# Patient Record
Sex: MALE | Race: WHITE | Employment: FULL TIME | URBAN - METROPOLITAN AREA
[De-identification: names, ages, dates, MRNs, and addresses within clinical notes are randomized per-mention and may not be internally consistent; named-entity substitution may affect disease eponyms.]

---

## 2023-11-03 PROBLEM — R53.81 MALAISE AND FATIGUE: Status: ACTIVE | Noted: 2023-11-03

## 2023-11-03 PROBLEM — S05.02XA CORNEAL ABRASION, LEFT: Status: ACTIVE | Noted: 2023-11-03

## 2023-11-03 PROBLEM — J45.909 ASTHMATIC BRONCHITIS (HHS-HCC): Status: ACTIVE | Noted: 2023-11-03

## 2023-11-03 PROBLEM — L40.0 PSORIASIS VULGARIS: Status: ACTIVE | Noted: 2022-09-06

## 2023-11-03 PROBLEM — R53.83 MALAISE AND FATIGUE: Status: ACTIVE | Noted: 2023-11-03

## 2023-11-03 PROBLEM — H57.89 EYE IRRITATION: Status: ACTIVE | Noted: 2023-11-03

## 2023-11-03 PROBLEM — J98.01 COUGH DUE TO BRONCHOSPASM: Status: ACTIVE | Noted: 2023-11-03

## 2023-11-03 RX ORDER — DOXYCYCLINE 100 MG/1
1 TABLET ORAL EVERY 12 HOURS
COMMUNITY
Start: 2019-10-19

## 2023-11-03 RX ORDER — IXEKIZUMAB 80 MG/ML
INJECTION, SOLUTION SUBCUTANEOUS
COMMUNITY
Start: 2022-09-06 | End: 2023-11-07 | Stop reason: ALTCHOICE

## 2023-11-03 RX ORDER — ALBUTEROL SULFATE 90 UG/1
AEROSOL, METERED RESPIRATORY (INHALATION)
COMMUNITY
Start: 2019-10-19

## 2023-11-03 RX ORDER — CLOBETASOL PROPIONATE 0.5 MG/G
CREAM TOPICAL
COMMUNITY
Start: 2021-08-14

## 2023-11-03 RX ORDER — BUPRENORPHINE AND NALOXONE 8; 2 MG/1; MG/1
FILM, SOLUBLE BUCCAL; SUBLINGUAL
COMMUNITY
Start: 2017-02-02

## 2023-11-07 ENCOUNTER — OFFICE VISIT (OUTPATIENT)
Dept: DERMATOLOGY | Facility: CLINIC | Age: 33
End: 2023-11-07
Payer: COMMERCIAL

## 2023-11-07 DIAGNOSIS — L95.9 VASCULITIS LIMITED TO SKIN: ICD-10-CM

## 2023-11-07 DIAGNOSIS — L40.9 PSORIASIS: Primary | ICD-10-CM

## 2023-11-07 PROCEDURE — 99214 OFFICE O/P EST MOD 30 MIN: CPT | Performed by: NURSE PRACTITIONER

## 2023-11-07 NOTE — PROGRESS NOTES
Subjective     Finesse Mcconnell is a 33 y.o. male who presents for the following: Psoriasis (Overall doing well, on Taltz. Here for refills. No further complaints.).     Review of Systems:  No other skin or systemic complaints other than what is documented elsewhere in the note.    The following portions of the chart were reviewed this encounter and updated as appropriate:  Tobacco  Allergies  Meds  Problems  Med Hx  Surg Hx  Fam Hx         Skin Cancer History  No skin cancer on file.      Specialty Problems          Dermatology Problems    Psoriasis vulgaris        Objective   Well appearing patient in no apparent distress; mood and affect are within normal limits.    A full examination was performed including scalp, head, eyes, ears, nose, lips, neck, chest, axillae, abdomen, back, buttocks, bilateral upper extremities, bilateral lower extremities, hands, feet, fingers, toes, fingernails, and toenails. All findings within normal limits unless otherwise noted below.    Assessment/Plan   1. Psoriasis  Skin clear on exam. PASI 100 on Taltz    - Psoriasis is a common, noncontagious condition that can present in a variety of ways in the skin. The subtypes of this condition include plaque, inverse (or skin fold), guttate, erythrodermic, and pustular psoriasis. Plaque psoriasis, which represents approximately 85% of psoriasis cases, is a lifelong skin condition that affects about 2%-3% of the population worldwide. Plaque psoriasis skin lesions are typically red and raised with overlying scale. There may be papules (small, raised bumps) or plaques (larger, raised skin lesions that are bigger than a thumbnail), or both. People with plaque psoriasis typically have thickened, white scaly patches on their skin.  - While the exact cause of psoriasis is unknown, this condition is the result of an overactive immune system that attacks the skin and other organs of the body. Psoriasis is very common in some families,  "suggesting a likely genetic component contributing to this disease, but it can also occur in individuals with no family history of psoriasis. Psoriasis can be triggered by certain environmental causes, such as emotional stress, pregnancy, injury to the skin, bacterial skin infections such as a streptococcal infection (\"strep\"), smoking or alcohol consumption, and ingesting certain medications.  - Because plaque psoriasis is a lifelong, chronic condition that currently has no cure, the goal of treatment is to decrease the number of skin lesions and reduce symptoms such as itching and pain. Most beneficial treatments for plaque psoriasis work in part due to their ability to alleviate the body's abnormal immune attack of the skin and help prevent the excessive buildup of skin cells or flakes.  - Bathe daily to help soften scales and moisten the skin. Avoid harsh soaps and scrubbing the skin as these may worsen psoriasis. Moisturizing soaps and soap substitutes, such as unscented Dove Sensitive Skin Beauty Bar, Vanicream Cleansing Bar, and CeraVe Psoriasis Cleanser, are milder products for the skin.  - The application of moisturizers after water exposure or bathing may be helpful. Heavier oil-based moisturizers help to retain water in the skin better than water-based moisturizers. Thicker moisturizers such as petroleum jelly (Vaseline), Aquaphor Healing Ointment, - Eucerin Original Healing Cream, Vanicream, Aveeno Moisturizing Cream, CeraVe Healing Ointment, or CeraVe Moisturizing Cream can be applied to damp skin daily after bathing. Use cream and ointments rather than lotions because lotions can dry out the skin.  - Apply over-the-counter hydrocortisone cream or ointment (0.5% or 1%) twice daily for 2-3 weeks at a time to help reduce itch and irritation. Stronger topical steroids are typically required for thicker psoriasis plaques. Long-term use of topical steroids should include periodic times of no treatment each " month to avoid thinning of the skin.  - Use of products with salicylic acid (shampoos, cleansers, and ointments), such as Neutrogena T/Sal, can help soften and remove thick psoriasis scale in the scalp.  - Small doses of natural sunlight may be helpful, such as 10-15 minutes approximately 2 or 3 times per week. Avoid too much sun; however, and protect your healthy skin from excessive sun exposure to help prevent premature aging of the skin and skin cancers.  - Follow a healthy diet to maintain an ideal weight. (Being overweight may make plaque psoriasis worse.)  Patient Support Resources  - The National Psoriasis Foundation (https://www.psoriasis.org/) is a useful resource for patients and health professionals that has additional information regarding psoriasis and the various available treatments. The National Psoriasis Foundation website includes access to psoriasis-related articles, psoriasis research, a directory of health care professionals with an expertise in psoriasis, and opportunities for patients to volunteer or get involved in upcoming events.  Plan  - Continue Taltz as prescribed (sample dose given today)  - Labs ordered; Tspot (lab order given - goes to [facility])  - Discussed risks, benefits, and side effects of medications.     Follow Up In Dermatology - Established Patient    2. 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  - A variety of inciting factors have been identified, including medications (especially antibiotics, NSAIDs, and diuretics), pathogens (hepatitis viruses, human immunodeficiency virus [HIV], Sapphire-Barr virus, and streptococci), malignancy, inflammatory bowel disease, or connective tissue disease (systemic lupus erythematosus [SLE], Sjögren syndrome, and rheumatoid  arthritis). Exercise-induced vasculitis, also known as golfer's vasculitis, is a very rare cause of LCV. It is triggered by strenuous exercise, such as running, prolonged walking, or golfing, particularly during warm weather. Up to 50% of cases, however, have no identifiable cause and are considered idiopathic.  - The following supportive / symptomatic measures may be taken:  Rest with elevation of the legs  Graduated compression stockings  NSAIDs for discomfort  - Please call the office if the condition worsens or fails to resolve.

## 2023-11-17 DIAGNOSIS — L40.9 PSORIASIS: ICD-10-CM

## 2024-02-14 ENCOUNTER — TELEPHONE (OUTPATIENT)
Dept: DERMATOLOGY | Facility: CLINIC | Age: 34
End: 2024-02-14
Payer: COMMERCIAL

## 2024-02-14 DIAGNOSIS — L40.9 PSORIASIS: ICD-10-CM

## 2024-02-14 NOTE — TELEPHONE ENCOUNTER
Pt left  stating that his insurance has changed and he now is using [facility] instead of [facility]. Can you please resend his Taltz prescription to [facility]?     Thanks!  Felicita Zuluaga RN

## 2024-06-13 DIAGNOSIS — L40.0 PSORIASIS VULGARIS: Primary | ICD-10-CM

## 2024-06-19 ENCOUNTER — LAB (OUTPATIENT)
Dept: LAB | Facility: LAB | Age: 34
End: 2024-06-19
Payer: COMMERCIAL

## 2024-06-19 ENCOUNTER — APPOINTMENT (OUTPATIENT)
Dept: DERMATOLOGY | Facility: CLINIC | Age: 34
End: 2024-06-19
Payer: COMMERCIAL

## 2024-06-19 DIAGNOSIS — L40.0 PSORIASIS VULGARIS: ICD-10-CM

## 2024-06-19 DIAGNOSIS — L40.9 PSORIASIS: ICD-10-CM

## 2024-06-19 DIAGNOSIS — I78.8 CAPILLARITIS: Primary | ICD-10-CM

## 2024-06-19 PROCEDURE — 36415 COLL VENOUS BLD VENIPUNCTURE: CPT

## 2024-06-19 PROCEDURE — 86481 TB AG RESPONSE T-CELL SUSP: CPT

## 2024-06-19 PROCEDURE — 99214 OFFICE O/P EST MOD 30 MIN: CPT | Performed by: NURSE PRACTITIONER

## 2024-06-19 NOTE — PROGRESS NOTES
Subjective     Finesse Mcconnell is a 33 y.o. male who presents for the following: Psoriasis.     Review of Systems:  No other skin or systemic complaints other than what is documented elsewhere in the note.    The following portions of the chart were reviewed this encounter and updated as appropriate:   Tobacco  Allergies  Meds  Problems  Med Hx  Surg Hx  Fam Hx         Skin Cancer History  No skin cancer on file.      Specialty Problems          Dermatology Problems    Psoriasis vulgaris        Objective   Well appearing patient in no apparent distress; mood and affect are within normal limits.    A focused skin examination was performed. All findings within normal limits unless otherwise noted below.    Assessment/Plan   1. Capillaritis (2)  Left Lower Leg - Anterior, Right Lower Leg - Anterior  Pinpoint nonblanching petechiae and red-to-tinoco-brown pigmented macules and plaques.    - Capillaritis is a condition where inflammation of the tiniest blood vessels in the skin results in leakage of blood and petechiae (tiny spots of bleeding under the skin that do not chuck when you press on them). Capillaritis is frequently found in individuals who stand for long periods of time, such as people who are on their feet a lot for work. Capillaritis can come and go intermittently.  - Reassured patient, no treatment is necessary.     2. Psoriasis  Skin clear on exam. PASI 100 on Taltz.     - Psoriasis is a common, noncontagious condition that can present in a variety of ways in the skin. The subtypes of this condition include plaque, inverse (or skin fold), guttate, erythrodermic, and pustular psoriasis. Plaque psoriasis, which represents approximately 85% of psoriasis cases, is a lifelong skin condition that affects about 2%-3% of the population worldwide. Plaque psoriasis skin lesions are typically red and raised with overlying scale. There may be papules (small, raised bumps) or plaques (larger, raised skin  "lesions that are bigger than a thumbnail), or both. People with plaque psoriasis typically have thickened, white scaly patches on their skin.  - While the exact cause of psoriasis is unknown, this condition is the result of an overactive immune system that attacks the skin and other organs of the body. Psoriasis is very common in some families, suggesting a likely genetic component contributing to this disease, but it can also occur in individuals with no family history of psoriasis. Psoriasis can be triggered by certain environmental causes, such as emotional stress, pregnancy, injury to the skin, bacterial skin infections such as a streptococcal infection (\"strep\"), smoking or alcohol consumption, and ingesting certain medications.  - Because plaque psoriasis is a lifelong, chronic condition that currently has no cure, the goal of treatment is to decrease the number of skin lesions and reduce symptoms such as itching and pain. Most beneficial treatments for plaque psoriasis work in part due to their ability to alleviate the body's abnormal immune attack of the skin and help prevent the excessive buildup of skin cells or flakes.  - Bathe daily to help soften scales and moisten the skin. Avoid harsh soaps and scrubbing the skin as these may worsen psoriasis. Moisturizing soaps and soap substitutes, such as unscented Dove Sensitive Skin Beauty Bar, Vanicream Cleansing Bar, and CeraVe Psoriasis Cleanser, are milder products for the skin.  - The application of moisturizers after water exposure or bathing may be helpful. Heavier oil-based moisturizers help to retain water in the skin better than water-based moisturizers. Thicker moisturizers such as petroleum jelly (Vaseline), Aquaphor Healing Ointment, - Eucerin Original Healing Cream, Vanicream, Aveeno Moisturizing Cream, CeraVe Healing Ointment, or CeraVe Moisturizing Cream can be applied to damp skin daily after bathing. Use cream and ointments rather than lotions " "because lotions can dry out the skin.  - Apply over-the-counter hydrocortisone cream or ointment (0.5% or 1%) twice daily for 2-3 weeks at a time to help reduce itch and irritation. Stronger topical steroids are typically required for thicker psoriasis plaques. Long-term use of topical steroids should include periodic times of no treatment each month to avoid thinning of the skin.  - Use of products with salicylic acid (shampoos, cleansers, and ointments), such as Neutrogena T/Sal, can help soften and remove thick psoriasis scale in the scalp.  - Small doses of natural sunlight may be helpful, such as 10-15 minutes approximately 2 or 3 times per week. Avoid too much sun; however, and protect your healthy skin from excessive sun exposure to help prevent premature aging of the skin and skin cancers.  - Follow a healthy diet to maintain an ideal weight. (Being overweight may make plaque psoriasis worse.)  Patient Support Resources  - The National Psoriasis Foundation (https://www.psoriasis.org/) is a useful resource for patients and health professionals that has additional information regarding psoriasis and the various available treatments. The National Psoriasis Foundation website includes access to psoriasis-related articles, psoriasis research, a directory of health care professionals with an expertise in psoriasis, and opportunities for patients to volunteer or get involved in upcoming events.  Plan  - Continue Taltz as directed.   - Labs ordered; Tspot.   - Discussed risks, benefits, and side effects of medications.     Related Medications  ixekizumab (Taltz) 80 mg/mL injection  Inject 1 mL (80 mg) under the skin every 28 (twenty-eight) days.        Psoriasis follow-up, overall doing well. Has (asymptomatic) \"rash\" to lower legs, PCP advised petechiae. Due for t-spot. No further complaints.  "

## 2024-06-21 LAB
NIL(NEG) CONTROL SPOT COUNT: NORMAL
PANEL A SPOT COUNT: 1
PANEL B SPOT COUNT: 1
POS CONTROL SPOT COUNT: NORMAL
T-SPOT. TB INTERPRETATION: NEGATIVE

## 2024-08-07 ENCOUNTER — APPOINTMENT (OUTPATIENT)
Dept: DERMATOLOGY | Facility: CLINIC | Age: 34
End: 2024-08-07
Payer: COMMERCIAL

## 2024-10-15 ENCOUNTER — TELEPHONE (OUTPATIENT)
Dept: DERMATOLOGY | Facility: CLINIC | Age: 34
End: 2024-10-15
Payer: COMMERCIAL

## 2024-10-15 NOTE — TELEPHONE ENCOUNTER
[facility] contacted office regarding a PA for pt's Taltz. Reached out to  Specialty [facility] and was updated that pt got new insurance and is requiring a new PA and that the Rx be sent to [facility] to be filled.  [facility] aware and working on new PA.     Felicita Zuluaga RN

## 2024-10-17 ENCOUNTER — TELEPHONE (OUTPATIENT)
Dept: DERMATOLOGY | Facility: CLINIC | Age: 34
End: 2024-10-17
Payer: COMMERCIAL

## 2024-10-17 NOTE — TELEPHONE ENCOUNTER
Pt contacted office stating that the pharmacy called him to notify him that he needed a prior authorization for his Taltz due to his insurance changing. Called pt and LVM stating that  [facility] has been notified of the change and is working on a new PA. Advised pt to call with any other questions or concerns.     Felicita Zuluaga RN

## 2024-10-23 ENCOUNTER — TELEPHONE (OUTPATIENT)
Dept: DERMATOLOGY | Facility: CLINIC | Age: 34
End: 2024-10-23
Payer: COMMERCIAL

## 2024-10-23 DIAGNOSIS — L40.9 PSORIASIS: Primary | ICD-10-CM

## 2024-10-23 RX ORDER — IXEKIZUMAB 80 MG/ML
80 INJECTION, SOLUTION SUBCUTANEOUS
Qty: 1 ML | Refills: 11 | Status: SHIPPED | OUTPATIENT
Start: 2024-10-23

## 2024-10-23 NOTE — TELEPHONE ENCOUNTER
Pt contacted office stating that he has not been able to get his most recent taltz injection due to waiting for the PA to be approved through his new insurance. Reached out to  [facility] and was informed that pt was approved and new medication will be sent to mandated pharmacy. Pt was told he could  sample of taltz in office for this month's injection.     Felicita Zuluaga RN

## 2024-10-23 NOTE — PROGRESS NOTES
Taltz prior authorization approved on patient's new insurance plan until 10/21/2025. Must fill with [facility].     Clinical pharmacist routed [facility] refills to [facility] per insuranc mandate.

## 2024-11-15 ENCOUNTER — APPOINTMENT (OUTPATIENT)
Dept: DERMATOLOGY | Facility: CLINIC | Age: 34
End: 2024-11-15
Payer: COMMERCIAL

## 2024-11-15 DIAGNOSIS — L40.9 PSORIASIS: Primary | ICD-10-CM

## 2024-11-15 PROCEDURE — 99213 OFFICE O/P EST LOW 20 MIN: CPT | Performed by: NURSE PRACTITIONER

## 2024-11-15 RX ORDER — LISINOPRIL AND HYDROCHLOROTHIAZIDE 20; 25 MG/1; MG/1
1 TABLET ORAL
COMMUNITY
Start: 2024-11-07

## 2024-11-15 NOTE — PROGRESS NOTES
Subjective     Finesse Mcconnell is a 34 y.o. male who presents for the following: Psoriasis (Pt doing well on taltz. Pt has a few areas on his legs he would like looked at. ).     Review of Systems:  No other skin or systemic complaints other than what is documented elsewhere in the note.    The following portions of the chart were reviewed this encounter and updated as appropriate:         Skin Cancer History  No skin cancer on file.      Specialty Problems          Dermatology Problems    Psoriasis vulgaris        Objective   Well appearing patient in no apparent distress; mood and affect are within normal limits.    A full examination was performed including scalp, head, eyes, ears, nose, lips, neck, chest, axillae, abdomen, back, buttocks, bilateral upper extremities, bilateral lower extremities, hands, feet, fingers, toes, fingernails, and toenails. All findings within normal limits unless otherwise noted below.    Assessment/Plan   1. Psoriasis  Skin clear on exam. PASI 100 on Taltz.     - Psoriasis is a common, noncontagious condition that can present in a variety of ways in the skin. The subtypes of this condition include plaque, inverse (or skin fold), guttate, erythrodermic, and pustular psoriasis. Plaque psoriasis, which represents approximately 85% of psoriasis cases, is a lifelong skin condition that affects about 2%-3% of the population worldwide. Plaque psoriasis skin lesions are typically red and raised with overlying scale. There may be papules (small, raised bumps) or plaques (larger, raised skin lesions that are bigger than a thumbnail), or both. People with plaque psoriasis typically have thickened, white scaly patches on their skin.  - While the exact cause of psoriasis is unknown, this condition is the result of an overactive immune system that attacks the skin and other organs of the body. Psoriasis is very common in some families, suggesting a likely genetic component contributing to this  "disease, but it can also occur in individuals with no family history of psoriasis. Psoriasis can be triggered by certain environmental causes, such as emotional stress, pregnancy, injury to the skin, bacterial skin infections such as a streptococcal infection (\"strep\"), smoking or alcohol consumption, and ingesting certain medications.  - Because plaque psoriasis is a lifelong, chronic condition that currently has no cure, the goal of treatment is to decrease the number of skin lesions and reduce symptoms such as itching and pain. Most beneficial treatments for plaque psoriasis work in part due to their ability to alleviate the body's abnormal immune attack of the skin and help prevent the excessive buildup of skin cells or flakes.  - Bathe daily to help soften scales and moisten the skin. Avoid harsh soaps and scrubbing the skin as these may worsen psoriasis. Moisturizing soaps and soap substitutes, such as unscented Dove Sensitive Skin Beauty Bar, Vanicream Cleansing Bar, and CeraVe Psoriasis Cleanser, are milder products for the skin.  - The application of moisturizers after water exposure or bathing may be helpful. Heavier oil-based moisturizers help to retain water in the skin better than water-based moisturizers. Thicker moisturizers such as petroleum jelly (Vaseline), Aquaphor Healing Ointment, - Eucerin Original Healing Cream, Vanicream, Aveeno Moisturizing Cream, CeraVe Healing Ointment, or CeraVe Moisturizing Cream can be applied to damp skin daily after bathing. Use cream and ointments rather than lotions because lotions can dry out the skin.  - Apply over-the-counter hydrocortisone cream or ointment (0.5% or 1%) twice daily for 2-3 weeks at a time to help reduce itch and irritation. Stronger topical steroids are typically required for thicker psoriasis plaques. Long-term use of topical steroids should include periodic times of no treatment each month to avoid thinning of the skin.  - Use of products with " salicylic acid (shampoos, cleansers, and ointments), such as Neutrogena T/Sal, can help soften and remove thick psoriasis scale in the scalp.  - Small doses of natural sunlight may be helpful, such as 10-15 minutes approximately 2 or 3 times per week. Avoid too much sun; however, and protect your healthy skin from excessive sun exposure to help prevent premature aging of the skin and skin cancers.  - Follow a healthy diet to maintain an ideal weight. (Being overweight may make plaque psoriasis worse.)  Patient Support Resources  - The National Psoriasis Foundation (https://www.psoriasis.org/) is a useful resource for patients and health professionals that has additional information regarding psoriasis and the various available treatments. The National Psoriasis Foundation website includes access to psoriasis-related articles, psoriasis research, a directory of health care professionals with an expertise in psoriasis, and opportunities for patients to volunteer or get involved in upcoming events.  Plan  - Continue Taltz as directed. Sample given today.   - Will repeat Tspot in May 2025  - Discussed risks, benefits, and side effects of medications.     Related Medications  ixekizumab (Taltz Autoinjector) 80 mg/mL injection  Inject 1 mL (80 mg) under the skin every 28 (twenty-eight) days.

## 2024-12-11 ENCOUNTER — APPOINTMENT (OUTPATIENT)
Dept: DERMATOLOGY | Facility: CLINIC | Age: 34
End: 2024-12-11
Payer: COMMERCIAL

## 2025-05-16 ENCOUNTER — APPOINTMENT (OUTPATIENT)
Dept: DERMATOLOGY | Facility: CLINIC | Age: 35
End: 2025-05-16
Payer: COMMERCIAL

## 2025-05-16 DIAGNOSIS — L72.0 EPIDERMAL CYST: ICD-10-CM

## 2025-05-16 DIAGNOSIS — L40.9 PSORIASIS: Primary | ICD-10-CM

## 2025-05-16 DIAGNOSIS — Z79.899 OTHER LONG TERM (CURRENT) DRUG THERAPY: ICD-10-CM

## 2025-05-16 PROCEDURE — 99214 OFFICE O/P EST MOD 30 MIN: CPT | Performed by: NURSE PRACTITIONER

## 2025-05-16 NOTE — PROGRESS NOTES
Subjective     Finesse Mcconnell is a 34 y.o. male who presents for the following: Psoriasis (Clear on Taltz. Due for T-spot. ).          Review of Systems:  No other skin or systemic complaints other than what is documented elsewhere in the note.    The following portions of the chart were reviewed this encounter and updated as appropriate:  Tobacco  Allergies  Meds  Problems  Med Hx  Surg Hx  Fam Hx         Skin Cancer History  Biopsy Log Book  No skin cancers from Specimen Tracking.    Additional History      Specialty Problems          Dermatology Problems    Psoriasis vulgaris     Past Medical History:  Finesse Mcconnell  has a past medical history of Acute upper respiratory infection, unspecified (12/02/2021) and Personal history of other diseases of the respiratory system (10/19/2019).    Past Surgical History:  Finesse Mcconnell  has no past surgical history on file.    Family History:  Patient family history is not on file.    Social History:  Finesse Mcconnell  reports that he has been smoking cigarettes. He has never used smokeless tobacco. No history on file for alcohol use and drug use.    Allergies:  Patient has no known allergies.    Current Medications / CAM's:  Current Medications[1]     Objective   Well appearing patient in no apparent distress; mood and affect are within normal limits.    A focused skin examination was performed. All findings within normal limits unless otherwise noted below.    Assessment/Plan   Skin Exam  1. PSORIASIS  Generalized  Skin clear on exam. PASI 100 on Taltz.   - Psoriasis is a common, noncontagious condition that can present in a variety of ways in the skin. The subtypes of this condition include plaque, inverse (or skin fold), guttate, erythrodermic, and pustular psoriasis. Plaque psoriasis, which represents approximately 85% of psoriasis cases, is a lifelong skin condition that affects about 2%-3% of the population worldwide. Plaque psoriasis skin  "lesions are typically red and raised with overlying scale. There may be papules (small, raised bumps) or plaques (larger, raised skin lesions that are bigger than a thumbnail), or both. People with plaque psoriasis typically have thickened, white scaly patches on their skin.  - While the exact cause of psoriasis is unknown, this condition is the result of an overactive immune system that attacks the skin and other organs of the body. Psoriasis is very common in some families, suggesting a likely genetic component contributing to this disease, but it can also occur in individuals with no family history of psoriasis. Psoriasis can be triggered by certain environmental causes, such as emotional stress, pregnancy, injury to the skin, bacterial skin infections such as a streptococcal infection (\"strep\"), smoking or alcohol consumption, and ingesting certain medications.  - Because plaque psoriasis is a lifelong, chronic condition that currently has no cure, the goal of treatment is to decrease the number of skin lesions and reduce symptoms such as itching and pain. Most beneficial treatments for plaque psoriasis work in part due to their ability to alleviate the body's abnormal immune attack of the skin and help prevent the excessive buildup of skin cells or flakes.  - Bathe daily to help soften scales and moisten the skin. Avoid harsh soaps and scrubbing the skin as these may worsen psoriasis. Moisturizing soaps and soap substitutes, such as unscented Dove Sensitive Skin Beauty Bar, Vanicream Cleansing Bar, and CeraVe Psoriasis Cleanser, are milder products for the skin.  - The application of moisturizers after water exposure or bathing may be helpful. Heavier oil-based moisturizers help to retain water in the skin better than water-based moisturizers. Thicker moisturizers such as petroleum jelly (Vaseline), Aquaphor Healing Ointment, - Eucerin Original Healing Cream, Vanicream, Aveeno Moisturizing Cream, CeraVe Healing " Ointment, or CeraVe Moisturizing Cream can be applied to damp skin daily after bathing. Use cream and ointments rather than lotions because lotions can dry out the skin.  - Apply over-the-counter hydrocortisone cream or ointment (0.5% or 1%) twice daily for 2-3 weeks at a time to help reduce itch and irritation. Stronger topical steroids are typically required for thicker psoriasis plaques. Long-term use of topical steroids should include periodic times of no treatment each month to avoid thinning of the skin.  - Use of products with salicylic acid (shampoos, cleansers, and ointments), such as Neutrogena T/Sal, can help soften and remove thick psoriasis scale in the scalp.  - Small doses of natural sunlight may be helpful, such as 10-15 minutes approximately 2 or 3 times per week. Avoid too much sun; however, and protect your healthy skin from excessive sun exposure to help prevent premature aging of the skin and skin cancers.  - Follow a healthy diet to maintain an ideal weight. (Being overweight may make plaque psoriasis worse.)  Patient Support Resources  - The National Psoriasis Foundation (https://www.psoriasis.org/) is a useful resource for patients and health professionals that has additional information regarding psoriasis and the various available treatments. The National Psoriasis Foundation website includes access to psoriasis-related articles, psoriasis research, a directory of health care professionals with an expertise in psoriasis, and opportunities for patients to volunteer or get involved in upcoming events.  Plan  - Continue Taltz as directed.   - Tspot ordered today  - Discussed risks, benefits, and side effects of medications.   Related Procedures  T-Spot TB  Related Medications  ixekizumab (Taltz Autoinjector) 80 mg/mL injection  Inject 1 mL (80 mg) under the skin every 28 (twenty-eight) days.  2. OTHER LONG TERM (CURRENT) DRUG THERAPY      Related Procedures  T-Spot TB  3. EPIDERMAL CYST  Chest  - Medial (Center)  1.5 cm subcutaneous nodule with normal-appearing overlying skin and connecting pore  -Discussed nature of the condition  -If approved by insurance, the office will call to schedule the procedure.   - Risks, benefits, and side effects discussed. Patient understood and agrees with the plan.     Related Procedures  Referral to Dermatology - Mohs Surgery         [1]   Current Outpatient Medications:     albuterol (Ventolin HFA) 90 mcg/actuation inhaler, INHALE 2 PUFFS EVERY 4-6 HOURS, SPACED 60 SECONDS APART., Disp: , Rfl:     buprenorphine-naloxone (Suboxone) 8-2 mg SL film, Place under the tongue., Disp: , Rfl:     clobetasol (Temovate) 0.05 % cream, 1 Application, Disp: , Rfl:     doxycycline (Adoxa) 100 mg tablet, Take 1 tablet (100 mg) by mouth every 12 hours., Disp: , Rfl:     ixekizumab (Taltz Autoinjector) 80 mg/mL injection, Inject 1 mL (80 mg) under the skin every 28 (twenty-eight) days., Disp: 1 mL, Rfl: 11    lisinopriL-hydrochlorothiazide 20-25 mg tablet, Take 1 tablet by mouth early in the morning.., Disp: , Rfl:

## 2025-05-16 NOTE — Clinical Note
"- Psoriasis is a common, noncontagious condition that can present in a variety of ways in the skin. The subtypes of this condition include plaque, inverse (or skin fold), guttate, erythrodermic, and pustular psoriasis. Plaque psoriasis, which represents approximately 85% of psoriasis cases, is a lifelong skin condition that affects about 2%-3% of the population worldwide. Plaque psoriasis skin lesions are typically red and raised with overlying scale. There may be papules (small, raised bumps) or plaques (larger, raised skin lesions that are bigger than a thumbnail), or both. People with plaque psoriasis typically have thickened, white scaly patches on their skin.  - While the exact cause of psoriasis is unknown, this condition is the result of an overactive immune system that attacks the skin and other organs of the body. Psoriasis is very common in some families, suggesting a likely genetic component contributing to this disease, but it can also occur in individuals with no family history of psoriasis. Psoriasis can be triggered by certain environmental causes, such as emotional stress, pregnancy, injury to the skin, bacterial skin infections such as a streptococcal infection (\"strep\"), smoking or alcohol consumption, and ingesting certain medications.  - Because plaque psoriasis is a lifelong, chronic condition that currently has no cure, the goal of treatment is to decrease the number of skin lesions and reduce symptoms such as itching and pain. Most beneficial treatments for plaque psoriasis work in part due to their ability to alleviate the body's abnormal immune attack of the skin and help prevent the excessive buildup of skin cells or flakes.  - Bathe daily to help soften scales and moisten the skin. Avoid harsh soaps and scrubbing the skin as these may worsen psoriasis. Moisturizing soaps and soap substitutes, such as unscented Dove Sensitive Skin Beauty Bar, Vanicream Cleansing Bar, and CeraVe Psoriasis " Cleanser, are milder products for the skin.  - The application of moisturizers after water exposure or bathing may be helpful. Heavier oil-based moisturizers help to retain water in the skin better than water-based moisturizers. Thicker moisturizers such as petroleum jelly (Vaseline), Aquaphor Healing Ointment, - Eucerin Original Healing Cream, Vanicream, Aveeno Moisturizing Cream, CeraVe Healing Ointment, or CeraVe Moisturizing Cream can be applied to damp skin daily after bathing. Use cream and ointments rather than lotions because lotions can dry out the skin.  - Apply over-the-counter hydrocortisone cream or ointment (0.5% or 1%) twice daily for 2-3 weeks at a time to help reduce itch and irritation. Stronger topical steroids are typically required for thicker psoriasis plaques. Long-term use of topical steroids should include periodic times of no treatment each month to avoid thinning of the skin.  - Use of products with salicylic acid (shampoos, cleansers, and ointments), such as Neutrogena T/Sal, can help soften and remove thick psoriasis scale in the scalp.  - Small doses of natural sunlight may be helpful, such as 10-15 minutes approximately 2 or 3 times per week. Avoid too much sun; however, and protect your healthy skin from excessive sun exposure to help prevent premature aging of the skin and skin cancers.  - Follow a healthy diet to maintain an ideal weight. (Being overweight may make plaque psoriasis worse.)  Patient Support Resources  - The National Psoriasis Foundation (https://www.psoriasis.org/) is a useful resource for patients and health professionals that has additional information regarding psoriasis and the various available treatments. The National Psoriasis Foundation website includes access to psoriasis-related articles, psoriasis research, a directory of health care professionals with an expertise in psoriasis, and opportunities for patients to volunteer or get involved in upcoming  events.  Plan  - Continue Taltz as directed.   - Tspot ordered today  - Discussed risks, benefits, and side effects of medications.

## 2025-05-16 NOTE — Clinical Note
-Discussed nature of the condition  -If approved by insurance, the office will call to schedule the procedure.   - Risks, benefits, and side effects discussed. Patient understood and agrees with the plan.

## 2026-05-22 ENCOUNTER — APPOINTMENT (OUTPATIENT)
Dept: DERMATOLOGY | Facility: CLINIC | Age: 36
End: 2026-05-22
Payer: COMMERCIAL